# Patient Record
Sex: FEMALE | Race: WHITE | NOT HISPANIC OR LATINO | Employment: FULL TIME | URBAN - METROPOLITAN AREA
[De-identification: names, ages, dates, MRNs, and addresses within clinical notes are randomized per-mention and may not be internally consistent; named-entity substitution may affect disease eponyms.]

---

## 2020-02-11 ENCOUNTER — OFFICE VISIT (OUTPATIENT)
Dept: PODIATRY | Facility: CLINIC | Age: 70
End: 2020-02-11
Payer: COMMERCIAL

## 2020-02-11 ENCOUNTER — ANCILLARY PROCEDURE (OUTPATIENT)
Dept: GENERAL RADIOLOGY | Facility: CLINIC | Age: 70
End: 2020-02-11
Attending: PODIATRIST
Payer: COMMERCIAL

## 2020-02-11 VITALS
SYSTOLIC BLOOD PRESSURE: 124 MMHG | HEIGHT: 62 IN | BODY MASS INDEX: 34.6 KG/M2 | WEIGHT: 188 LBS | DIASTOLIC BLOOD PRESSURE: 80 MMHG

## 2020-02-11 DIAGNOSIS — M21.619 BUNION: ICD-10-CM

## 2020-02-11 DIAGNOSIS — M21.619 BUNION: Primary | ICD-10-CM

## 2020-02-11 PROBLEM — G47.00 INSOMNIA: Status: ACTIVE | Noted: 2018-06-15

## 2020-02-11 PROBLEM — D12.6 BENIGN NEOPLASM OF COLON: Status: ACTIVE | Noted: 2018-06-15

## 2020-02-11 PROBLEM — M81.0 OSTEOPOROSIS: Status: ACTIVE | Noted: 2018-06-15

## 2020-02-11 PROBLEM — B00.9 HERPES SIMPLEX: Status: ACTIVE | Noted: 2018-06-15

## 2020-02-11 PROBLEM — R03.0 ELEVATED BLOOD PRESSURE READING IN OFFICE WITHOUT DIAGNOSIS OF HYPERTENSION: Status: ACTIVE | Noted: 2018-06-15

## 2020-02-11 PROBLEM — F17.200 TOBACCO USE DISORDER: Status: ACTIVE | Noted: 2018-06-15

## 2020-02-11 PROBLEM — M54.42 ACUTE LEFT-SIDED LOW BACK PAIN WITH LEFT-SIDED SCIATICA: Status: ACTIVE | Noted: 2018-06-15

## 2020-02-11 PROBLEM — R10.13 EPIGASTRIC PAIN: Status: ACTIVE | Noted: 2018-06-15

## 2020-02-11 PROBLEM — R59.9 LYMPH NODE ENLARGEMENT: Status: ACTIVE | Noted: 2018-06-18

## 2020-02-11 PROCEDURE — 99203 OFFICE O/P NEW LOW 30 MIN: CPT | Performed by: PODIATRIST

## 2020-02-11 PROCEDURE — 73630 X-RAY EXAM OF FOOT: CPT | Mod: 59

## 2020-02-11 PROCEDURE — 73630 X-RAY EXAM OF FOOT: CPT | Mod: LT

## 2020-02-11 RX ORDER — CETIRIZINE HYDROCHLORIDE 10 MG/1
10 TABLET ORAL DAILY
COMMUNITY
Start: 2020-01-04

## 2020-02-11 RX ORDER — MULTIPLE VITAMINS W/ MINERALS TAB 9MG-400MCG
TAB ORAL
COMMUNITY
Start: 2017-06-13

## 2020-02-11 RX ORDER — LEVOTHYROXINE SODIUM 125 UG/1
TABLET ORAL
COMMUNITY
Start: 2020-01-20

## 2020-02-11 RX ORDER — MONTELUKAST SODIUM 10 MG/1
TABLET ORAL
COMMUNITY
Start: 2019-11-19

## 2020-02-11 ASSESSMENT — MIFFLIN-ST. JEOR: SCORE: 1336.01

## 2020-02-11 NOTE — PROGRESS NOTES
Subjective:    Pt is seen today as a new pt referral with the c/c of painful right and left foot.  This has been symptomatic for the past 3 months.  Points to medial head of first metatarsal.  Has pain w/ ambulation and shoewear and is relieved by rest and going barefoot.  Denies pain in the contralateral foot.  Describes as burning pain.  Reports family history of bunions.  Denies weakness, numbness, edema and ecchymosis.   This started when she was on a trip to [place].  She forgot her shoes.  She started wearing her nephew shoes which were too tight.  She works as a  and she is mostly sitting.  She is a social smoker.  She has a history of osteoporosis.  She is on vitamin D.        ROS:   A 10-point review of systems was performed and is positive for that noted in the HPI and as seen below.  All other areas are negative.      No Known Allergies    Current Outpatient Medications   Medication Sig Dispense Refill     Calcium Carbonate-Vit D-Min (CALCIUM 600+D3 PLUS MINERALS) 600-800 MG-UNIT TABS Take 1 per day       cetirizine (ZYRTEC) 10 MG tablet Take 10 mg by mouth daily       levothyroxine (SYNTHROID/LEVOTHROID) 125 MCG tablet Take 1 tablet (125 mcg) by mouth once daily.       multivitamin w/minerals (MULTI-VITAMIN) tablet Daily.       montelukast (SINGULAIR) 10 MG tablet Take 1 tablet by mouth in the evening.         Patient Active Problem List   Diagnosis     Acquired hypothyroidism     Acute left-sided low back pain with left-sided sciatica     Benign neoplasm of colon     Elevated blood pressure reading in office without diagnosis of hypertension     Epigastric pain     Herpes simplex     Insomnia     Lymph node enlargement     Osteoporosis     Tobacco use disorder     Vitamin D deficiency       No past medical history on file.    No past surgical history on file.    No family history on file.    Social History     Tobacco Use     Smoking status: Light Tobacco Smoker     Smokeless tobacco: Never  "Used   Substance Use Topics     Alcohol use: Not on file       Objective:    O:  /80   Ht 1.575 m (5' 2\")   Wt 85.3 kg (188 lb)   BMI 34.39 kg/m  .      Constitutional/ general:  Pt is in no apparent distress, appears well-nourished.  Cooperative with history and physical exam.     Psych:  The patient answered questions appropriately.  Normal affect.  Seems to have reasonable expectations, in terms of treatment.     Eyes:  Visual scanning/ tracking without deficit.    Ears:  Response to auditory stimuli is normal.  No  hearing aid devices.  Auricles in proper alignment.     Lymphatic:  Popliteal lymph nodes not enlarged.     Lungs:  Non labored breathing, non labored speech. No cough.  No audible wheezing. Even, quiet breathing.       Vascular:  Pedal pulses are palpable bilaterally for both the DP and PT arteries.  CFT < 3 sec.  No edema.  Pedal hair growth noted.     Neuro:  Alert and oriented x 3. Coordinated gait.  Light touch sensation is intact to the L4, L5, S1 distributions. No obvious deficits.  No evidence of neurological-based weakness, spasticity, or contracture in the lower extremities.     Derm: Normal texture and turgor.  No erythema, ecchymosis, or cyanosis.  No open lesions.     Musculoskeletal:    Lower extremity muscle strength is normal.  Patient is ambulatory without an assistive device or brace.    Pronated arch with weightbearing.   No equinus.   Bilateral bunion deformity noted.  No pain with range of motion.  Positive tracking with ROM.  No medial bursa or masses noted.  No pain on the sesamoids or dorsally.        Radiographic Exam:   X-ray three views foot shows IM angle of 14 degree bilateral.  Abducted hallux.   No other fractures/pathology noted.      Assessment:  Hallux abducto valgus deformity right and left    Plan:  Xray taken of both feet.  Explained to patient that a bunion is caused by a muscle imbalance. The big toe is pulled toward the smaller toes. The lump is created " by a bone pushing outward.   Bunion pain is usually a combination of shoes rubbing on the skin, nerve irritation, compression between the toes, joint misalignment, arthritis, and altered gait.   Most bunion pain can be improved by wearing compatible shoes. People with bunions cannot choose footwear just because they like the style. Your bunion should determine what shoe should be worn. Wide shoes with non-irritating seams, soft leather, and a square toe box are most compatible. Shoes should fit well out of the box but may need to be professionally stretched and modified to accommodate the bump. Heels, dress shoes, and pointed toes will not provide comfort.   Shoe inserts or orthotics will often times help with the bunion pain, however, inserts make a shoe fit more challenging. Pads placed around the bunion may help.   Bunion surgery involves cutting and repositioning the bones surrounding the bunion. Pins and screws are used to hold the bones in place during the healing process. The goal of bunion surgery is to reduce the size of the bunion bump. Realignment of the toe and joint is attempted. Most first toes can not be forced back into a normal alignment even with surgery. Discussed conservative treatments such as good shoes with wide forefoot and no heels.  Dispensed toe .  Patient will get over-the-counter arch support.  Discussed orthotics but she declines at this time because of the expense.  She will call if she would like a pair.  Briefly discussed surgery.  Patient would have to at a minimum quit smoking for 3 months and have a normal vitamin D level before considering surgery.  Also discussed because of her osteoporosis she is high risk for having problems with her surgery.  We encouraged her and conservative treatments.  Return to clinic prn.    Jose Santoyo, KEVON LUND, FACFAS

## 2020-02-11 NOTE — PATIENT INSTRUCTIONS
Weight management plan: Patient was referred to their PCP to discuss a diet and exercise plan. SMOKING CESSATION  What's in cigarette smoke? - Cigarette smoke contains over 4,000 chemicals. Nicotine is one of the main ingredients which is an insecticide/herbicide. It is poisonous to our nervous system, increases blood clotting risk, and decreases the body's defenses to fight off infection. Another chemical is Carbon Monoxide is an asphyxiating gas that permanently binds to blood cells and blocks the transport of oxygen. This leads to tissue death and decreases your metabolism. Tar is a chemical that coats your lungs and trachea which impairs new oxygen coming in and carbon dioxide getting out of your body.   How does smoking impact surgery? - Smoking is particularly hazardous with regards to surgery. Surgery puts stress on the body and a smoker's body is already under strain from these chemicals. Putting the two together, especially for an elective surgery, could be a recipe for disaster. Smoking before and after surgery increases your risk of heart problems, slow wound healing, delayed bone healing, blood clots, wound infection and anesthesia complications.   What are the benefits of quitting? - In 20 minutes your blood pressure will drop back down to normal. In 8 hours the carbon monoxide (a toxic gas) levels in your blood stream will drop by half, and oxygen levels will return to normal. In 48 hours your chance of having a heart attack will have decreased. All nicotine will have left your body. Your sense of taste and smell will return to a normal level. In 72 hours your bronchial tubes will relax, and your energy levels will increase. In 2 weeks your circulation will increase, and it will continue to improve for the next 10 weeks.    Recommendations for elective surgery - Ideally, patients should quit smoking 8 weeks before and at least 2 weeks after elective surgery in order to avoid complications. Simply  cutting back on the amount of cigarettes smoked per day does not offer any benefit or decrease the risk of poor wound healing, heart problems, and infection. Smokers should also start taking Vitamin C and B for two weeks before surgery and two weeks after surgery.    Ways to Stop Smokin. Nicotine patches, lozenges, or gum  2. Support Groups  3. Medications (see below)    List of Medications:  1. Varenicline Tartrate (CHANTIX)   2. Bupropion HCL (WELLBUTRIN, ZYBAN) - note: make sure Wellbutrin is for smoking cessation and not other issues   3. Nicotine Patch (NICODERM)   4. Nicotine Inhaler (NICOTROL)   5. Nicotine Gum Nicotine Polacrilex   6. Nicotine Lozenge: Nicotine Polacrilex (COMMIT)   * [facility] offers a smoking support group as well!  Please visit: [email or web address]  If you are interested in these, ask about getting a prescription or talk to your primary care doctor about what may be the best way for you to quit.       Fallon to follow up with Primary Care provider regarding elevated blood pressure.    We wish you continued good healing. If you have any questions or concerns, please do not hesitate to contact us at [phone]    Please remember to call and schedule a follow up appointment if one was recommended at your earliest convenience.   PODIATRY CLINIC HOURS  TELEPHONE NUMBER    Dr. Jose Santoyo D.P.M [facility]    Clinics:  [facility]    [place] [facility]   Tuesday 1PM-6PM  [facility]  Wednesday 7AM-2PM  [facility]  Thursday 10AM-6PM  [place]  Friday 7AM-3PM  [place]  Specialty schedulers:   [phone] to make an appointment with any Specialty Provider.        Urgent Care locations:    [place] Monday-Friday 5 pm - 9 pm. Saturday- 9 am -5pm    Monday-Friday 11 am - 9 pm Saturday 9 am - 5 pm     Monday- 12 noon-8PM [phone]        [phone]     If you need a medication refill, please contact us you may need lab work and/or a follow up visit prior to your refill (i.e. Antifungal medications).    [facility] [employer] (secure e-mail communication and access to your chart) to send a message or to make an appointment.    If MRI needed please call [facility] at [phone]

## 2023-03-20 ENCOUNTER — OFFICE VISIT (OUTPATIENT)
Dept: OPHTHALMOLOGY | Facility: CLINIC | Age: 73
End: 2023-03-20
Payer: COMMERCIAL

## 2023-03-20 DIAGNOSIS — H25.813 COMBINED FORMS OF AGE-RELATED CATARACT OF BOTH EYES: ICD-10-CM

## 2023-03-20 DIAGNOSIS — H52.4 PRESBYOPIA: ICD-10-CM

## 2023-03-20 DIAGNOSIS — Z01.01 ENCOUNTER FOR EXAMINATION OF EYES AND VISION WITH ABNORMAL FINDINGS: Primary | ICD-10-CM

## 2023-03-20 PROCEDURE — 92004 COMPRE OPH EXAM NEW PT 1/>: CPT | Performed by: OPHTHALMOLOGY

## 2023-03-20 PROCEDURE — 92015 DETERMINE REFRACTIVE STATE: CPT | Performed by: OPHTHALMOLOGY

## 2023-03-20 ASSESSMENT — REFRACTION_MANIFEST
OS_CYLINDER: +2.00
OD_SPHERE: +0.75
OD_CYLINDER: +2.25
OS_AXIS: 168
OS_SPHERE: +1.00
OS_ADD: +2.50
OD_ADD: +2.50
OD_AXIS: 005

## 2023-03-20 ASSESSMENT — VISUAL ACUITY
OS_CC+: -1
METHOD: SNELLEN - LINEAR
OD_CC: 20/25
OD_CC+: -1
CORRECTION_TYPE: GLASSES
OS_CC: 20/20

## 2023-03-20 ASSESSMENT — TONOMETRY
OD_IOP_MMHG: 18
OS_IOP_MMHG: 18
IOP_METHOD: APPLANATION

## 2023-03-20 ASSESSMENT — CONF VISUAL FIELD
OS_INFERIOR_TEMPORAL_RESTRICTION: 0
METHOD: COUNTING FINGERS
OD_SUPERIOR_NASAL_RESTRICTION: 0
OD_SUPERIOR_TEMPORAL_RESTRICTION: 0
OD_INFERIOR_TEMPORAL_RESTRICTION: 0
OD_INFERIOR_NASAL_RESTRICTION: 0
OS_SUPERIOR_TEMPORAL_RESTRICTION: 0
OS_SUPERIOR_NASAL_RESTRICTION: 0
OS_INFERIOR_NASAL_RESTRICTION: 0
OD_NORMAL: 1
OS_NORMAL: 1

## 2023-03-20 ASSESSMENT — REFRACTION_WEARINGRX
OS_ADD: +2.25
OD_CYLINDER: +1.50
OD_AXIS: 022
SPECS_TYPE: PAL
OD_ADD: +2.25
OD_SPHERE: +1.00
OS_CYLINDER: +1.00
OS_AXIS: 166
OS_SPHERE: +1.25

## 2023-03-20 ASSESSMENT — CUP TO DISC RATIO
OD_RATIO: 0.5
OS_RATIO: 0.4

## 2023-03-20 ASSESSMENT — SLIT LAMP EXAM - LIDS
COMMENTS: NORMAL
COMMENTS: NORMAL

## 2023-03-20 ASSESSMENT — EXTERNAL EXAM - RIGHT EYE: OD_EXAM: NORMAL

## 2023-03-20 ASSESSMENT — EXTERNAL EXAM - LEFT EYE: OS_EXAM: NORMAL

## 2023-03-20 NOTE — LETTER
"    3/20/2023         RE: Fallon Thao  [address] [place] [facility] [address]        Dear Colleague,    Thank you for referring your patient, Fallon Thao, to the [facility]. Please see a copy of my visit note below.     Current Eye Medications:  None     Subjective:  Complete eye exam. Vision is doing pretty well both eyes distance and near both eyes. No eye pain or discomfort in either eye.      Objective:  See Ophthalmology Exam.       Assessment:  Baseline eye exam in patient with mild cataracts.      ICD-10-CM    1. Encounter for examination of eyes and vision with abnormal findings  Z01.01       2. Presbyopia  H52.4       3. Combined forms of age-related cataract, mild, of both eyes  H25.813            Plan:  Glasses prescription given - optional  May use artificial tears up to four times a day (like Refresh Optive, Systane Balance, TheraTears, or generic artificial tears are ok. Avoid \"get the red out\" drops).   Possible posterior vitreous detachment (sudden onset large floater and/or flashing lights) both eyes discussed.   Call in November 2023 for an appointment in March 2024 for Complete Exam    Dr. Lin [phone]             Again, thank you for allowing me to participate in the care of your patient.        Sincerely,        Bobo Lin MD    "

## 2023-03-20 NOTE — PROGRESS NOTES
" Current Eye Medications:  None     Subjective:  Complete eye exam. Vision is doing pretty well both eyes distance and near both eyes. No eye pain or discomfort in either eye.      Objective:  See Ophthalmology Exam.       Assessment:  Baseline eye exam in patient with mild cataracts.      ICD-10-CM    1. Encounter for examination of eyes and vision with abnormal findings  Z01.01       2. Presbyopia  H52.4       3. Combined forms of age-related cataract, mild, of both eyes  H25.813            Plan:  Glasses prescription given - optional  May use artificial tears up to four times a day (like Refresh Optive, Systane Balance, TheraTears, or generic artificial tears are ok. Avoid \"get the red out\" drops).   Possible posterior vitreous detachment (sudden onset large floater and/or flashing lights) both eyes discussed.   Call in November 2023 for an appointment in March 2024 for Complete Exam    Dr. Lin [phone]         "

## 2023-03-20 NOTE — PATIENT INSTRUCTIONS
"Glasses prescription given - optional  May use artificial tears up to four times a day (like Refresh Optive, Systane Balance, TheraTears, or generic artificial tears are ok. Avoid \"get the red out\" drops).   Possible posterior vitreous detachment (sudden onset large floater and/or flashing lights) both eyes discussed.   Call in November 2023 for an appointment in March 2024 for Complete Exam    Dr. Lin [phone]    "

## 2023-04-01 PROBLEM — H25.813 COMBINED FORMS OF AGE-RELATED CATARACT OF BOTH EYES: Status: ACTIVE | Noted: 2023-04-01

## 2024-04-10 ENCOUNTER — OFFICE VISIT (OUTPATIENT)
Dept: OPHTHALMOLOGY | Facility: CLINIC | Age: 74
End: 2024-04-10
Payer: COMMERCIAL

## 2024-04-10 DIAGNOSIS — Z01.01 ENCOUNTER FOR EXAMINATION OF EYES AND VISION WITH ABNORMAL FINDINGS: Primary | ICD-10-CM

## 2024-04-10 DIAGNOSIS — H52.4 PRESBYOPIA: ICD-10-CM

## 2024-04-10 DIAGNOSIS — H43.813 POSTERIOR VITREOUS DETACHMENT OF BOTH EYES: ICD-10-CM

## 2024-04-10 DIAGNOSIS — H25.813 COMBINED FORMS OF AGE-RELATED CATARACT OF BOTH EYES: ICD-10-CM

## 2024-04-10 PROCEDURE — 92014 COMPRE OPH EXAM EST PT 1/>: CPT | Performed by: OPHTHALMOLOGY

## 2024-04-10 PROCEDURE — 92015 DETERMINE REFRACTIVE STATE: CPT | Performed by: OPHTHALMOLOGY

## 2024-04-10 ASSESSMENT — SLIT LAMP EXAM - LIDS
COMMENTS: NORMAL
COMMENTS: NORMAL

## 2024-04-10 ASSESSMENT — REFRACTION_WEARINGRX
OD_SPHERE: +1.00
OS_SPHERE: +1.25
OD_AXIS: 022
OS_ADD: +2.25
SPECS_TYPE: PAL
OD_CYLINDER: +1.50
OS_CYLINDER: +1.00
OD_ADD: +2.25
OS_AXIS: 166

## 2024-04-10 ASSESSMENT — EXTERNAL EXAM - LEFT EYE: OS_EXAM: NORMAL

## 2024-04-10 ASSESSMENT — CONF VISUAL FIELD
OD_INFERIOR_TEMPORAL_RESTRICTION: 0
OD_INFERIOR_NASAL_RESTRICTION: 0
OD_SUPERIOR_TEMPORAL_RESTRICTION: 0
OD_NORMAL: 1
OD_SUPERIOR_NASAL_RESTRICTION: 0
OS_SUPERIOR_TEMPORAL_RESTRICTION: 0
OS_NORMAL: 1
OS_SUPERIOR_NASAL_RESTRICTION: 0
OS_INFERIOR_TEMPORAL_RESTRICTION: 0
OS_INFERIOR_NASAL_RESTRICTION: 0

## 2024-04-10 ASSESSMENT — REFRACTION_MANIFEST
OD_SPHERE: +0.75
OS_SPHERE: +1.00
OS_ADD: +2.50
OS_CYLINDER: +1.75
OD_CYLINDER: +2.00
OD_ADD: +2.50
OS_AXIS: 172
OD_AXIS: 007

## 2024-04-10 ASSESSMENT — TONOMETRY
OD_IOP_MMHG: 19
IOP_METHOD: APPLANATION
OS_IOP_MMHG: 19

## 2024-04-10 ASSESSMENT — VISUAL ACUITY
OD_CC: 20/25
OD_CC+: -1
OS_CC+: -1
OS_CC: 20/20
METHOD: SNELLEN - LINEAR
CORRECTION_TYPE: GLASSES

## 2024-04-10 ASSESSMENT — EXTERNAL EXAM - RIGHT EYE: OD_EXAM: NORMAL

## 2024-04-10 ASSESSMENT — CUP TO DISC RATIO
OD_RATIO: 0.5
OS_RATIO: 0.4

## 2024-04-10 NOTE — PROGRESS NOTES
" Current Eye Medications:  None     Subjective:  Complete eye exam. Vision is doing OK both eyes. Has to squint to see up close. If on computer or I-phone to long eyes hurt. Sometimes feel shooting pain behind right eye in head once in a while, not often. Not sure if pain goes all the way to right eye or just in head. In March fell and hit back of head, no concussion.      Objective:  See Ophthalmology Exam.       Assessment:  New posterior vitreous detachment noted both eyes; otherwise stable eye exam.      ICD-10-CM    1. Encounter for examination of eyes and vision with abnormal findings  Z01.01       2. Presbyopia  H52.4       3. Combined forms of age-related cataract, mild, of both eyes  H25.813       4. Posterior vitreous detachment of both eyes  H43.813            Plan:  Glasses prescription given - optional    May use artificial tears up to four times a day (like Refresh Optive, Systane Balance, or TheraTears. Avoid \"get the red out\" drops and generic artifical tears).     Call in December 2024 for an appointment in April 2025 for Complete Exam    Dr. Lin [phone]      "

## 2024-04-10 NOTE — LETTER
"    4/10/2024         RE: Fallon Thao  [address]  [facility] [address]        Dear Colleague,    Thank you for referring your patient, Fallon Thao, to the [facility]. Please see a copy of my visit note below.     Current Eye Medications:  None     Subjective:  Complete eye exam. Vision is doing OK both eyes. Has to squint to see up close. If on computer or I-phone to long eyes hurt. Sometimes feel shooting pain behind right eye in head once in a while, not often. Not sure if pain goes all the way to right eye or just in head. In March fell and hit back of head, no concussion.      Objective:  See Ophthalmology Exam.       Assessment:  New posterior vitreous detachment noted both eyes; otherwise stable eye exam.      ICD-10-CM    1. Encounter for examination of eyes and vision with abnormal findings  Z01.01       2. Presbyopia  H52.4       3. Combined forms of age-related cataract, mild, of both eyes  H25.813       4. Posterior vitreous detachment of both eyes  H43.813            Plan:  Glasses prescription given - optional    May use artificial tears up to four times a day (like Refresh Optive, Systane Balance, or TheraTears. Avoid \"get the red out\" drops and generic artifical tears).     Call in December 2024 for an appointment in April 2025 for Complete Exam    Dr. Lin [phone]      Again, thank you for allowing me to participate in the care of your patient.        Sincerely,        Bobo Lin MD  "

## 2024-04-10 NOTE — PATIENT INSTRUCTIONS
"Glasses prescription given - optional    May use artificial tears up to four times a day (like Refresh Optive, Systane Balance, or TheraTears. Avoid \"get the red out\" drops and generic artifical tears).     Call in December 2024 for an appointment in April 2025 for Complete Exam    Dr. Lin [phone]    "

## 2024-04-14 PROBLEM — H43.813 POSTERIOR VITREOUS DETACHMENT OF BOTH EYES: Status: ACTIVE | Noted: 2024-04-14

## 2024-08-12 ENCOUNTER — PATIENT OUTREACH (OUTPATIENT)
Dept: CARE COORDINATION | Facility: CLINIC | Age: 74
End: 2024-08-12
Payer: COMMERCIAL

## 2024-08-26 ENCOUNTER — PATIENT OUTREACH (OUTPATIENT)
Dept: CARE COORDINATION | Facility: CLINIC | Age: 74
End: 2024-08-26
Payer: COMMERCIAL

## 2025-05-16 ENCOUNTER — OFFICE VISIT (OUTPATIENT)
Dept: OPHTHALMOLOGY | Facility: CLINIC | Age: 75
End: 2025-05-16
Payer: COMMERCIAL

## 2025-05-16 DIAGNOSIS — H43.813 POSTERIOR VITREOUS DETACHMENT OF BOTH EYES: ICD-10-CM

## 2025-05-16 DIAGNOSIS — Z01.01 ENCOUNTER FOR EXAMINATION OF EYES AND VISION WITH ABNORMAL FINDINGS: Primary | ICD-10-CM

## 2025-05-16 DIAGNOSIS — H57.11 PAIN OF RIGHT EYE: ICD-10-CM

## 2025-05-16 DIAGNOSIS — H25.813 COMBINED FORMS OF AGE-RELATED CATARACT OF BOTH EYES: ICD-10-CM

## 2025-05-16 DIAGNOSIS — H52.4 PRESBYOPIA: ICD-10-CM

## 2025-05-16 PROCEDURE — 92014 COMPRE OPH EXAM EST PT 1/>: CPT | Performed by: OPHTHALMOLOGY

## 2025-05-16 PROCEDURE — 92015 DETERMINE REFRACTIVE STATE: CPT | Performed by: OPHTHALMOLOGY

## 2025-05-16 ASSESSMENT — TONOMETRY
IOP_METHOD: APPLANATION
OS_IOP_MMHG: 19
OD_IOP_MMHG: 20

## 2025-05-16 ASSESSMENT — REFRACTION_WEARINGRX
OS_CYLINDER: +1.75
OD_AXIS: 010
OD_SPHERE: +1.00
OD_ADD: +2.50
OD_CYLINDER: +1.75
OS_ADD: +2.50
SPECS_TYPE: PAL
OS_AXIS: 173
OS_SPHERE: +1.00

## 2025-05-16 ASSESSMENT — CUP TO DISC RATIO
OD_RATIO: 0.5
OS_RATIO: 0.4

## 2025-05-16 ASSESSMENT — VISUAL ACUITY
OS_CC+: -2
OD_CC: 20/25
CORRECTION_TYPE: GLASSES
OS_CC: 20/25
OD_CC+: -1
METHOD: SNELLEN - LINEAR

## 2025-05-16 ASSESSMENT — CONF VISUAL FIELD
OS_SUPERIOR_NASAL_RESTRICTION: 0
OS_SUPERIOR_TEMPORAL_RESTRICTION: 0
OS_INFERIOR_NASAL_RESTRICTION: 0
OD_INFERIOR_TEMPORAL_RESTRICTION: 0
OD_NORMAL: 1
METHOD: COUNTING FINGERS
OD_SUPERIOR_TEMPORAL_RESTRICTION: 0
OS_NORMAL: 1
OS_INFERIOR_TEMPORAL_RESTRICTION: 0
OD_SUPERIOR_NASAL_RESTRICTION: 0
OD_INFERIOR_NASAL_RESTRICTION: 0

## 2025-05-16 ASSESSMENT — REFRACTION_MANIFEST
OD_CYLINDER: +2.25
OS_SPHERE: +0.75
OS_CYLINDER: +1.75
OS_ADD: +2.50
OD_ADD: +2.50
OS_AXIS: 072
OD_SPHERE: +0.75
OD_AXIS: 010

## 2025-05-16 ASSESSMENT — SLIT LAMP EXAM - LIDS
COMMENTS: NORMAL
COMMENTS: NORMAL

## 2025-05-16 ASSESSMENT — EXTERNAL EXAM - LEFT EYE: OS_EXAM: NORMAL

## 2025-05-16 ASSESSMENT — EXTERNAL EXAM - RIGHT EYE: OD_EXAM: NORMAL

## 2025-05-16 NOTE — LETTER
"5/16/2025      Fallon Thao  [address] [facility] [address]      Dear Colleague,    Thank you for referring your patient, Fallon Thao, to the [facility]. Please see a copy of my visit note below.     Current Eye Medications:  None     Subjective:  Complete eye exam. Vision is doing OK both eyes in the distance. Having trouble with right eye when on computer for to long or phone. Sometimes feels stabbing pain deep down right eye, been going on since last seen for eye exam.     Objective:  See Ophthalmology Exam.       Assessment:  Stable mild cataracts both eyes.  Mild intermittent pain right eye of indeterminate etiology.      ICD-10-CM    1. Encounter for examination of eyes and vision with abnormal findings  Z01.01       2. Presbyopia  H52.4       3. Combined forms of age-related cataract, mild, of both eyes  H25.813       4. Pain of right eye  H57.11       5. Posterior vitreous detachment of both eyes  H43.813            Plan:  Glasses prescription given - optional    Use artificial tears up to four times a day (like Refresh Optive, Systane Balance, or TheraTears. Avoid \"get the red out\" drops and generic artifical tears).     Call in January 2026 for an appointment in May 2026 for Complete Exam    Dr. Lin [phone]         Again, thank you for allowing me to participate in the care of your patient.        Sincerely,        Bobo Lin MD    Electronically signed"

## 2025-05-16 NOTE — PATIENT INSTRUCTIONS
"Glasses prescription given - optional    Use artificial tears up to four times a day (like Refresh Optive, Systane Balance, or TheraTears. Avoid \"get the red out\" drops and generic artifical tears).     Call in January 2026 for an appointment in May 2026 for Complete Exam    Dr. Lin [phone]    "

## 2025-05-16 NOTE — PROGRESS NOTES
" Current Eye Medications:  None     Subjective:  Complete eye exam. Vision is doing OK both eyes in the distance. Having trouble with right eye when on computer for to long or phone. Sometimes feels stabbing pain deep down right eye, been going on since last seen for eye exam.     Objective:  See Ophthalmology Exam.       Assessment:  Stable mild cataracts both eyes.  Mild intermittent pain right eye of indeterminate etiology.      ICD-10-CM    1. Encounter for examination of eyes and vision with abnormal findings  Z01.01       2. Presbyopia  H52.4       3. Combined forms of age-related cataract, mild, of both eyes  H25.813       4. Pain of right eye  H57.11       5. Posterior vitreous detachment of both eyes  H43.813            Plan:  Glasses prescription given - optional    Use artificial tears up to four times a day (like Refresh Optive, Systane Balance, or TheraTears. Avoid \"get the red out\" drops and generic artifical tears).     Call in January 2026 for an appointment in May 2026 for Complete Exam    Dr. Lin [phone]         "

## 2025-05-17 PROBLEM — H57.11 PAIN OF RIGHT EYE: Status: ACTIVE | Noted: 2025-05-17
